# Patient Record
Sex: FEMALE | Race: BLACK OR AFRICAN AMERICAN | Employment: OTHER | URBAN - METROPOLITAN AREA
[De-identification: names, ages, dates, MRNs, and addresses within clinical notes are randomized per-mention and may not be internally consistent; named-entity substitution may affect disease eponyms.]

---

## 2021-05-11 ENCOUNTER — TELEPHONE (OUTPATIENT)
Dept: FAMILY MEDICINE CLINIC | Facility: CLINIC | Age: 51
End: 2021-05-11

## 2021-05-11 NOTE — TELEPHONE ENCOUNTER
Surgery on 05/20/21, Left L5-S1 micro, possible L4-5 lami with Dr. Kenyetta Soria @ [facility]    H&P- complete  Labs- HBG (10.1), hematocrit (33.8), MCHC (29.9), RDW-SD (47.9), ALT (9), AST (10), albumin (3.3), globulin (4.8), A/G ratio (0.7), PT (15.1), INR (1.21), PTT

## 2021-05-12 ENCOUNTER — LAB ENCOUNTER (OUTPATIENT)
Dept: LAB | Facility: HOSPITAL | Age: 51
End: 2021-05-12
Attending: FAMILY MEDICINE
Payer: MEDICARE

## 2021-05-12 ENCOUNTER — HOSPITAL ENCOUNTER (OUTPATIENT)
Dept: GENERAL RADIOLOGY | Facility: HOSPITAL | Age: 51
Discharge: HOME | End: 2021-05-12
Attending: FAMILY MEDICINE
Payer: MEDICARE

## 2021-05-12 ENCOUNTER — OFFICE VISIT (OUTPATIENT)
Dept: FAMILY MEDICINE CLINIC | Facility: CLINIC | Age: 51
End: 2021-05-12
Payer: MEDICARE

## 2021-05-12 VITALS
TEMPERATURE: 98 F | SYSTOLIC BLOOD PRESSURE: 118 MMHG | OXYGEN SATURATION: 98 % | BODY MASS INDEX: 50.14 KG/M2 | WEIGHT: 283 LBS | DIASTOLIC BLOOD PRESSURE: 82 MMHG | RESPIRATION RATE: 16 BRPM | HEIGHT: 63 IN | HEART RATE: 89 BPM

## 2021-05-12 DIAGNOSIS — Z86.73 HISTORY OF CVA (CEREBROVASCULAR ACCIDENT): ICD-10-CM

## 2021-05-12 DIAGNOSIS — Z87.891 HISTORY OF TOBACCO ABUSE: ICD-10-CM

## 2021-05-12 DIAGNOSIS — M48.061 SPINAL STENOSIS OF LUMBAR REGION, UNSPECIFIED WHETHER NEUROGENIC CLAUDICATION PRESENT: ICD-10-CM

## 2021-05-12 DIAGNOSIS — Z01.812 PRE-OPERATIVE LABORATORY EXAMINATION: ICD-10-CM

## 2021-05-12 DIAGNOSIS — Z01.818 PREOP EXAMINATION: ICD-10-CM

## 2021-05-12 DIAGNOSIS — Q21.1 PFO (PATENT FORAMEN OVALE): ICD-10-CM

## 2021-05-12 DIAGNOSIS — Z01.812 PRE-OPERATIVE LABORATORY EXAMINATION: Primary | ICD-10-CM

## 2021-05-12 DIAGNOSIS — E66.01 MORBID OBESITY (HCC): ICD-10-CM

## 2021-05-12 PROCEDURE — 86850 RBC ANTIBODY SCREEN: CPT

## 2021-05-12 PROCEDURE — 85730 THROMBOPLASTIN TIME PARTIAL: CPT

## 2021-05-12 PROCEDURE — 71046 X-RAY EXAM CHEST 2 VIEWS: CPT | Performed by: FAMILY MEDICINE

## 2021-05-12 PROCEDURE — 86901 BLOOD TYPING SEROLOGIC RH(D): CPT

## 2021-05-12 PROCEDURE — 93010 ELECTROCARDIOGRAM REPORT: CPT | Performed by: FAMILY MEDICINE

## 2021-05-12 PROCEDURE — 81015 MICROSCOPIC EXAM OF URINE: CPT

## 2021-05-12 PROCEDURE — 99204 OFFICE O/P NEW MOD 45 MIN: CPT | Performed by: FAMILY MEDICINE

## 2021-05-12 PROCEDURE — 85610 PROTHROMBIN TIME: CPT

## 2021-05-12 PROCEDURE — 36415 COLL VENOUS BLD VENIPUNCTURE: CPT

## 2021-05-12 PROCEDURE — 80053 COMPREHEN METABOLIC PANEL: CPT

## 2021-05-12 PROCEDURE — 93005 ELECTROCARDIOGRAM TRACING: CPT

## 2021-05-12 PROCEDURE — 87081 CULTURE SCREEN ONLY: CPT

## 2021-05-12 PROCEDURE — 85025 COMPLETE CBC W/AUTO DIFF WBC: CPT

## 2021-05-12 PROCEDURE — 86900 BLOOD TYPING SEROLOGIC ABO: CPT

## 2021-05-12 RX ORDER — TOPIRAMATE 100 MG/1
100 TABLET, FILM COATED ORAL 2 TIMES DAILY
COMMUNITY
Start: 2021-04-18

## 2021-05-12 RX ORDER — MELOXICAM 15 MG/1
15 TABLET ORAL DAILY
Status: ON HOLD | COMMUNITY
Start: 2021-04-18 | End: 2021-05-20

## 2021-05-12 RX ORDER — TRAMADOL HYDROCHLORIDE 50 MG/1
50 TABLET ORAL 3 TIMES DAILY PRN
Status: ON HOLD | COMMUNITY
Start: 2021-04-22 | End: 2021-05-20

## 2021-05-12 RX ORDER — GABAPENTIN 600 MG/1
600 TABLET ORAL 3 TIMES DAILY PRN
COMMUNITY
Start: 2021-04-18

## 2021-05-12 RX ORDER — ASCORBIC ACID 100 MG
1 TABLET,CHEWABLE ORAL DAILY
COMMUNITY
End: 2021-05-18

## 2021-05-12 NOTE — TELEPHONE ENCOUNTER
Dr. No Bender, please review:    Labs- HBG (10.1), hematocrit (33.8), MCHC (29.9), RDW-SD (47.9), ALT (9), AST (10), albumin (3.3), globulin (4.8), A/G ratio (0.7), PT (15.1), INR (1.21), PTT (33.0), +MSSA, all other labs WNL  EKG- abnormal  X-ray- Postproce

## 2021-05-14 PROBLEM — Z86.73 HISTORY OF CVA (CEREBROVASCULAR ACCIDENT): Status: ACTIVE | Noted: 2021-05-14

## 2021-05-14 PROBLEM — Q21.1 PFO (PATENT FORAMEN OVALE): Status: ACTIVE | Noted: 2021-05-14

## 2021-05-14 PROBLEM — M48.061 LUMBAR SPINAL STENOSIS: Status: ACTIVE | Noted: 2021-05-14

## 2021-05-14 PROBLEM — E66.01 MORBID OBESITY (HCC): Status: ACTIVE | Noted: 2021-05-14

## 2021-05-14 PROBLEM — Z87.891 HISTORY OF TOBACCO ABUSE: Status: ACTIVE | Noted: 2021-05-14

## 2021-05-14 PROBLEM — G43.909 MIGRAINE: Status: ACTIVE | Noted: 2021-05-14

## 2021-05-14 NOTE — H&P
[facility] PRE-OP CLINIC [place]    PRE-OP NOTE    HPI:   I have been consulted by Dr. Umm Upton to see Mickey Likes 52year old female for a preoperative evaluation and medical clearance. Kirk Mcknight has a long history of worsening severe low back pain.  She is History    Socioeconomic History      Marital status: Single      Spouse name: Not on file      Number of children: Not on file      Years of education: Not on file      Highest education level: Not on file    Occupational History      Not on file    [place] cough  GASTROINTESTINAL:  Denies abdominal pain, nausea, vomiting, constipation, diarrhea, or blood in stool.   MUSCULOSKELETAL: low back pain and weakness as noted above  NEUROLOGICAL:  Denies headache, seizures, dizziness, syncope, paralysis, ataxia,  HEM .0 05/12/2021    CREATSERUM 0.75 05/12/2021    BUN 11 05/12/2021     05/12/2021    K 4.0 05/12/2021     05/12/2021    CO2 26.0 05/12/2021    GLU 79 05/12/2021    CA 8.9 05/12/2021    ALB 3.3 (L) 05/12/2021    ALKPHO 53 05/12/2021    B

## 2021-05-16 NOTE — TELEPHONE ENCOUNTER
Spoke to patient let her know she is clear for surgery. She started abx ointment for +MSSA 05/15/21.

## 2021-05-18 ENCOUNTER — LAB ENCOUNTER (OUTPATIENT)
Dept: LAB | Age: 51
End: 2021-05-18
Attending: ORTHOPAEDIC SURGERY
Payer: MEDICARE

## 2021-05-18 DIAGNOSIS — Z01.818 PRE-OP TESTING: ICD-10-CM

## 2021-05-20 ENCOUNTER — ANESTHESIA EVENT (OUTPATIENT)
Dept: SURGERY | Facility: HOSPITAL | Age: 51
End: 2021-05-20
Payer: MEDICARE

## 2021-05-20 ENCOUNTER — ANESTHESIA (OUTPATIENT)
Dept: SURGERY | Facility: HOSPITAL | Age: 51
End: 2021-05-20
Payer: MEDICARE

## 2021-05-20 ENCOUNTER — HOSPITAL ENCOUNTER (OUTPATIENT)
Facility: HOSPITAL | Age: 51
Discharge: HOME | End: 2021-05-21
Attending: ORTHOPAEDIC SURGERY | Admitting: ORTHOPAEDIC SURGERY
Payer: MEDICARE

## 2021-05-20 ENCOUNTER — APPOINTMENT (OUTPATIENT)
Dept: GENERAL RADIOLOGY | Facility: HOSPITAL | Age: 51
End: 2021-05-20
Attending: ORTHOPAEDIC SURGERY
Payer: MEDICARE

## 2021-05-20 DIAGNOSIS — Z01.818 PRE-OP TESTING: Primary | ICD-10-CM

## 2021-05-20 PROBLEM — Z98.890 S/P LUMBAR LAMINECTOMY: Status: ACTIVE | Noted: 2021-05-20

## 2021-05-20 PROBLEM — Z98.890 S/P LUMBAR MICRODISCECTOMY: Status: ACTIVE | Noted: 2021-05-20

## 2021-05-20 PROCEDURE — 01NB0ZZ RELEASE LUMBAR NERVE, OPEN APPROACH: ICD-10-PCS | Performed by: ORTHOPAEDIC SURGERY

## 2021-05-20 PROCEDURE — 0ST20ZZ RESECTION OF LUMBAR VERTEBRAL DISC, OPEN APPROACH: ICD-10-PCS | Performed by: ORTHOPAEDIC SURGERY

## 2021-05-20 PROCEDURE — 76000 FLUOROSCOPY <1 HR PHYS/QHP: CPT | Performed by: ORTHOPAEDIC SURGERY

## 2021-05-20 RX ORDER — ACETAMINOPHEN 500 MG
1000 TABLET ORAL ONCE
Status: COMPLETED | OUTPATIENT
Start: 2021-05-20 | End: 2021-05-20

## 2021-05-20 RX ORDER — HYDROMORPHONE HYDROCHLORIDE 1 MG/ML
0.4 INJECTION, SOLUTION INTRAMUSCULAR; INTRAVENOUS; SUBCUTANEOUS EVERY 2 HOUR PRN
Status: DISCONTINUED | OUTPATIENT
Start: 2021-05-20 | End: 2021-05-21

## 2021-05-20 RX ORDER — GABAPENTIN 600 MG/1
600 TABLET ORAL 3 TIMES DAILY PRN
Status: DISCONTINUED | OUTPATIENT
Start: 2021-05-20 | End: 2021-05-21

## 2021-05-20 RX ORDER — HYDROMORPHONE HYDROCHLORIDE 1 MG/ML
0.6 INJECTION, SOLUTION INTRAMUSCULAR; INTRAVENOUS; SUBCUTANEOUS EVERY 5 MIN PRN
Status: DISCONTINUED | OUTPATIENT
Start: 2021-05-20 | End: 2021-05-20 | Stop reason: HOSPADM

## 2021-05-20 RX ORDER — BISACODYL 10 MG
10 SUPPOSITORY, RECTAL RECTAL
Status: DISCONTINUED | OUTPATIENT
Start: 2021-05-20 | End: 2021-05-21

## 2021-05-20 RX ORDER — HYDROMORPHONE HYDROCHLORIDE 1 MG/ML
0.8 INJECTION, SOLUTION INTRAMUSCULAR; INTRAVENOUS; SUBCUTANEOUS EVERY 2 HOUR PRN
Status: DISCONTINUED | OUTPATIENT
Start: 2021-05-20 | End: 2021-05-21

## 2021-05-20 RX ORDER — NEOSTIGMINE METHYLSULFATE 1 MG/ML
INJECTION INTRAVENOUS AS NEEDED
Status: DISCONTINUED | OUTPATIENT
Start: 2021-05-20 | End: 2021-05-20 | Stop reason: SURG

## 2021-05-20 RX ORDER — LIDOCAINE HYDROCHLORIDE 10 MG/ML
INJECTION, SOLUTION EPIDURAL; INFILTRATION; INTRACAUDAL; PERINEURAL AS NEEDED
Status: DISCONTINUED | OUTPATIENT
Start: 2021-05-20 | End: 2021-05-20 | Stop reason: SURG

## 2021-05-20 RX ORDER — DIPHENHYDRAMINE HCL 25 MG
25 CAPSULE ORAL EVERY 4 HOURS PRN
Status: DISCONTINUED | OUTPATIENT
Start: 2021-05-20 | End: 2021-05-21

## 2021-05-20 RX ORDER — SODIUM CHLORIDE, SODIUM LACTATE, POTASSIUM CHLORIDE, CALCIUM CHLORIDE 600; 310; 30; 20 MG/100ML; MG/100ML; MG/100ML; MG/100ML
INJECTION, SOLUTION INTRAVENOUS CONTINUOUS
Status: DISCONTINUED | OUTPATIENT
Start: 2021-05-20 | End: 2021-05-21

## 2021-05-20 RX ORDER — SODIUM PHOSPHATE, DIBASIC AND SODIUM PHOSPHATE, MONOBASIC 7; 19 G/133ML; G/133ML
1 ENEMA RECTAL ONCE AS NEEDED
Status: DISCONTINUED | OUTPATIENT
Start: 2021-05-20 | End: 2021-05-21

## 2021-05-20 RX ORDER — MORPHINE SULFATE 10 MG/ML
6 INJECTION, SOLUTION INTRAMUSCULAR; INTRAVENOUS EVERY 10 MIN PRN
Status: DISCONTINUED | OUTPATIENT
Start: 2021-05-20 | End: 2021-05-20 | Stop reason: HOSPADM

## 2021-05-20 RX ORDER — MORPHINE SULFATE 4 MG/ML
2 INJECTION, SOLUTION INTRAMUSCULAR; INTRAVENOUS EVERY 10 MIN PRN
Status: DISCONTINUED | OUTPATIENT
Start: 2021-05-20 | End: 2021-05-20 | Stop reason: HOSPADM

## 2021-05-20 RX ORDER — NALOXONE HYDROCHLORIDE 0.4 MG/ML
80 INJECTION, SOLUTION INTRAMUSCULAR; INTRAVENOUS; SUBCUTANEOUS AS NEEDED
Status: DISCONTINUED | OUTPATIENT
Start: 2021-05-20 | End: 2021-05-20 | Stop reason: HOSPADM

## 2021-05-20 RX ORDER — GLYCOPYRROLATE 0.2 MG/ML
INJECTION, SOLUTION INTRAMUSCULAR; INTRAVENOUS AS NEEDED
Status: DISCONTINUED | OUTPATIENT
Start: 2021-05-20 | End: 2021-05-20 | Stop reason: SURG

## 2021-05-20 RX ORDER — FAMOTIDINE 20 MG/1
20 TABLET ORAL ONCE
Status: COMPLETED | OUTPATIENT
Start: 2021-05-20 | End: 2021-05-20

## 2021-05-20 RX ORDER — HYDROMORPHONE HYDROCHLORIDE 1 MG/ML
0.2 INJECTION, SOLUTION INTRAMUSCULAR; INTRAVENOUS; SUBCUTANEOUS EVERY 2 HOUR PRN
Status: DISCONTINUED | OUTPATIENT
Start: 2021-05-20 | End: 2021-05-21

## 2021-05-20 RX ORDER — POLYETHYLENE GLYCOL 3350 17 G/17G
17 POWDER, FOR SOLUTION ORAL DAILY PRN
Status: DISCONTINUED | OUTPATIENT
Start: 2021-05-20 | End: 2021-05-21

## 2021-05-20 RX ORDER — ONDANSETRON 2 MG/ML
4 INJECTION INTRAMUSCULAR; INTRAVENOUS EVERY 4 HOURS PRN
Status: DISCONTINUED | OUTPATIENT
Start: 2021-05-20 | End: 2021-05-21

## 2021-05-20 RX ORDER — ROCURONIUM BROMIDE 10 MG/ML
INJECTION, SOLUTION INTRAVENOUS AS NEEDED
Status: DISCONTINUED | OUTPATIENT
Start: 2021-05-20 | End: 2021-05-20 | Stop reason: SURG

## 2021-05-20 RX ORDER — ACETAMINOPHEN 325 MG/1
650 TABLET ORAL EVERY 4 HOURS PRN
Status: DISCONTINUED | OUTPATIENT
Start: 2021-05-20 | End: 2021-05-21

## 2021-05-20 RX ORDER — HALOPERIDOL 5 MG/ML
0.25 INJECTION INTRAMUSCULAR ONCE AS NEEDED
Status: DISCONTINUED | OUTPATIENT
Start: 2021-05-20 | End: 2021-05-20 | Stop reason: HOSPADM

## 2021-05-20 RX ORDER — DEXAMETHASONE SODIUM PHOSPHATE 4 MG/ML
VIAL (ML) INJECTION AS NEEDED
Status: DISCONTINUED | OUTPATIENT
Start: 2021-05-20 | End: 2021-05-20 | Stop reason: SURG

## 2021-05-20 RX ORDER — HYDROCODONE BITARTRATE AND ACETAMINOPHEN 10; 325 MG/1; MG/1
2 TABLET ORAL EVERY 4 HOURS PRN
Status: DISCONTINUED | OUTPATIENT
Start: 2021-05-20 | End: 2021-05-21

## 2021-05-20 RX ORDER — TIZANIDINE 2 MG/1
2 TABLET ORAL EVERY 8 HOURS PRN
Status: DISCONTINUED | OUTPATIENT
Start: 2021-05-20 | End: 2021-05-21

## 2021-05-20 RX ORDER — DIAZEPAM 5 MG/1
5 TABLET ORAL EVERY 6 HOURS PRN
Status: DISCONTINUED | OUTPATIENT
Start: 2021-05-20 | End: 2021-05-21

## 2021-05-20 RX ORDER — DIPHENHYDRAMINE HYDROCHLORIDE 50 MG/ML
25 INJECTION INTRAMUSCULAR; INTRAVENOUS EVERY 4 HOURS PRN
Status: DISCONTINUED | OUTPATIENT
Start: 2021-05-20 | End: 2021-05-21

## 2021-05-20 RX ORDER — TOPIRAMATE 100 MG/1
100 TABLET, FILM COATED ORAL 2 TIMES DAILY
Status: DISCONTINUED | OUTPATIENT
Start: 2021-05-20 | End: 2021-05-21

## 2021-05-20 RX ORDER — PROCHLORPERAZINE EDISYLATE 5 MG/ML
10 INJECTION INTRAMUSCULAR; INTRAVENOUS EVERY 6 HOURS PRN
Status: DISCONTINUED | OUTPATIENT
Start: 2021-05-20 | End: 2021-05-21

## 2021-05-20 RX ORDER — MORPHINE SULFATE 4 MG/ML
4 INJECTION, SOLUTION INTRAMUSCULAR; INTRAVENOUS EVERY 10 MIN PRN
Status: DISCONTINUED | OUTPATIENT
Start: 2021-05-20 | End: 2021-05-20 | Stop reason: HOSPADM

## 2021-05-20 RX ORDER — BUPIVACAINE HYDROCHLORIDE AND EPINEPHRINE 5; 5 MG/ML; UG/ML
INJECTION, SOLUTION PERINEURAL AS NEEDED
Status: DISCONTINUED | OUTPATIENT
Start: 2021-05-20 | End: 2021-05-20 | Stop reason: HOSPADM

## 2021-05-20 RX ORDER — HYDROMORPHONE HYDROCHLORIDE 1 MG/ML
0.2 INJECTION, SOLUTION INTRAMUSCULAR; INTRAVENOUS; SUBCUTANEOUS EVERY 5 MIN PRN
Status: DISCONTINUED | OUTPATIENT
Start: 2021-05-20 | End: 2021-05-20 | Stop reason: HOSPADM

## 2021-05-20 RX ORDER — METOCLOPRAMIDE 10 MG/1
10 TABLET ORAL ONCE
Status: COMPLETED | OUTPATIENT
Start: 2021-05-20 | End: 2021-05-20

## 2021-05-20 RX ORDER — SENNOSIDES 8.6 MG
17.2 TABLET ORAL NIGHTLY
Status: DISCONTINUED | OUTPATIENT
Start: 2021-05-20 | End: 2021-05-21

## 2021-05-20 RX ORDER — HYDROMORPHONE HYDROCHLORIDE 1 MG/ML
0.4 INJECTION, SOLUTION INTRAMUSCULAR; INTRAVENOUS; SUBCUTANEOUS EVERY 5 MIN PRN
Status: DISCONTINUED | OUTPATIENT
Start: 2021-05-20 | End: 2021-05-20 | Stop reason: HOSPADM

## 2021-05-20 RX ORDER — HYDROCODONE BITARTRATE AND ACETAMINOPHEN 10; 325 MG/1; MG/1
1 TABLET ORAL EVERY 4 HOURS PRN
Status: DISCONTINUED | OUTPATIENT
Start: 2021-05-20 | End: 2021-05-21

## 2021-05-20 RX ORDER — PROCHLORPERAZINE EDISYLATE 5 MG/ML
5 INJECTION INTRAMUSCULAR; INTRAVENOUS ONCE AS NEEDED
Status: DISCONTINUED | OUTPATIENT
Start: 2021-05-20 | End: 2021-05-20 | Stop reason: HOSPADM

## 2021-05-20 RX ORDER — DOCUSATE SODIUM 100 MG/1
100 CAPSULE, LIQUID FILLED ORAL 2 TIMES DAILY
Status: DISCONTINUED | OUTPATIENT
Start: 2021-05-20 | End: 2021-05-21

## 2021-05-20 RX ORDER — ONDANSETRON 2 MG/ML
INJECTION INTRAMUSCULAR; INTRAVENOUS AS NEEDED
Status: DISCONTINUED | OUTPATIENT
Start: 2021-05-20 | End: 2021-05-20 | Stop reason: SURG

## 2021-05-20 RX ORDER — ONDANSETRON 2 MG/ML
4 INJECTION INTRAMUSCULAR; INTRAVENOUS ONCE AS NEEDED
Status: DISCONTINUED | OUTPATIENT
Start: 2021-05-20 | End: 2021-05-20 | Stop reason: HOSPADM

## 2021-05-20 RX ADMIN — ROCURONIUM BROMIDE 40 MG: 10 INJECTION, SOLUTION INTRAVENOUS at 11:31:00

## 2021-05-20 RX ADMIN — GLYCOPYRROLATE 0.6 MG: 0.2 INJECTION, SOLUTION INTRAMUSCULAR; INTRAVENOUS at 12:48:00

## 2021-05-20 RX ADMIN — SODIUM CHLORIDE, SODIUM LACTATE, POTASSIUM CHLORIDE, CALCIUM CHLORIDE: 600; 310; 30; 20 INJECTION, SOLUTION INTRAVENOUS at 12:52:00

## 2021-05-20 RX ADMIN — NEOSTIGMINE METHYLSULFATE 5 MG: 1 INJECTION INTRAVENOUS at 12:48:00

## 2021-05-20 RX ADMIN — DEXAMETHASONE SODIUM PHOSPHATE 4 MG: 4 MG/ML VIAL (ML) INJECTION at 11:48:00

## 2021-05-20 RX ADMIN — ONDANSETRON 4 MG: 2 INJECTION INTRAMUSCULAR; INTRAVENOUS at 11:48:00

## 2021-05-20 RX ADMIN — DEXAMETHASONE SODIUM PHOSPHATE 6 MG: 4 MG/ML VIAL (ML) INJECTION at 12:49:00

## 2021-05-20 RX ADMIN — LIDOCAINE HYDROCHLORIDE 50 MG: 10 INJECTION, SOLUTION EPIDURAL; INFILTRATION; INTRACAUDAL; PERINEURAL at 11:25:00

## 2021-05-20 RX ADMIN — SODIUM CHLORIDE, SODIUM LACTATE, POTASSIUM CHLORIDE, CALCIUM CHLORIDE: 600; 310; 30; 20 INJECTION, SOLUTION INTRAVENOUS at 11:21:00

## 2021-05-20 NOTE — PROGRESS NOTES
[facility] HOSP - [facility]    Progress Note    Naveen [place] Patient Status:  Outpatient in a Bed    1970 MRN [id number]   Location [address] UNIT Attending Wicho Zamora MD   Hosp Day # 0 PCP No primary care provi by (CST): Yaneth Cannon MD on 5/20/2021 at 2:22 PM                Assessment and Plan:     Lumbar spinal stenosis  Now surgically corrected      Morbid obesity ([address]        S/P lumbar laminectomy  Stable post op       S/P lumbar microdiscectomy  Stable post

## 2021-05-20 NOTE — PROGRESS NOTES
Patient denies anaphylaxis with Arlin Reddy, she experiences \"itching\" alleviated with Benadryl and has tolerated this medication in the past.

## 2021-05-20 NOTE — PLAN OF CARE
Alert and oriented x x3, RA, voiding freely, 1 x walker, surgical dressing c/d/I, LR @ 20, 1 norco for pain.    Problem: Patient Centered Care  Goal: Patient preferences are identified and integrated in the patient's plan of care  Description: Interventions

## 2021-05-20 NOTE — ANESTHESIA POSTPROCEDURE EVALUATION
Patient: Ben Xaio    Procedure Summary     Date: 05/20/21 Room / Location: [address] [place] [address] MAIN OR 05 / [address] [place] [address] MAIN OR    Anesthesia Start: 1494 Anesthesia Stop: 0138    Procedures:       Left L5-S1 microdiscectomy, L4-5 laminectomy (Left Spine Lumbar)      LUMBA

## 2021-05-20 NOTE — H&P
[address] Patient Status:  Outpatient in a Bed    1970 MRN [id number]   Location [facility] Attending Ramón Mccann MD   Hosp Day # 0 PCP No primary care provider on file.      Active Pro strength 4/5 EHL/tib ant on the left with 5-/5 on the right, calves soft and non tender, 2+DP    Assessment: Left L5-S1 HNP, left lumbar radiculopathy    Plan: L5-S1 microdiscectomy, possible L4-5 laminectomy    Real Hubbard PA-C with Tesfaye Simon MD

## 2021-05-20 NOTE — ANESTHESIA PREPROCEDURE EVALUATION
Anesthesia PreOp Note    HPI:     Shahbaz Greco is a 52year old female who presents for preoperative consultation requested by: Rocio Angulo MD    Date of Surgery: 5/20/2021    Procedure(s):  Left L5-S1 microdiscectomy, possible L4-5 laminectomy  JAKUB 3 (three) times daily as needed. , Disp: , Rfl: , 5/19/2021 at 0600  traMADol HCl 50 MG Oral Tab, Take 50 mg by mouth 3 (three) times daily as needed. , Disp: , Rfl: , 5/19/2021 at 0600  Meloxicam 15 MG Oral Tab, Take 15 mg by mouth daily. , Disp: , Rfl: ,     Difficulty of Paying Living Expenses:   Food Insecurity:       Worried About [address] in the Last Year:       Ran Out of Food in the Last Year:   Transportation Needs:       Lack of Transportation (Medical):       Lack of Transportation (No reviewed and Nursing notes reviewed    No history of anesthetic complications (woke up during gastric sleeve surgery)   Airway   Mallampati: III  TM distance: >3 FB  Neck ROM: full  Dental - normal exam         Pulmonary - negative ROS   Cardiovascular - n

## 2021-05-20 NOTE — ANESTHESIA PROCEDURE NOTES
Airway  Urgency: elective      General Information and Staff    Patient location during procedure: OR  Anesthesiologist: Cruz Pelaez MD  Performed: anesthesiologist     Indications and Patient Condition  Indications for airway management: anesthe

## 2021-05-21 VITALS
BODY MASS INDEX: 51.91 KG/M2 | OXYGEN SATURATION: 100 % | SYSTOLIC BLOOD PRESSURE: 115 MMHG | HEART RATE: 73 BPM | DIASTOLIC BLOOD PRESSURE: 63 MMHG | WEIGHT: 293 LBS | HEIGHT: 63 IN | RESPIRATION RATE: 17 BRPM | TEMPERATURE: 98 F

## 2021-05-21 PROCEDURE — 80048 BASIC METABOLIC PNL TOTAL CA: CPT | Performed by: FAMILY MEDICINE

## 2021-05-21 PROCEDURE — 85027 COMPLETE CBC AUTOMATED: CPT | Performed by: FAMILY MEDICINE

## 2021-05-21 NOTE — OPERATIVE REPORT
PATIENT    Tiffany Golden    DATE OF SURGERY  5/20/21    SURGEON   Sara Nunn MD    ASSISTANT  ANA MARIA Summers    PREOPERATIVE DIAGNOSIS   Left lumbar radiculopathy with motor weakness  Large L5-S1 superiorly migrated HNP  L4-5 lateral recess stenosis dissection. Sequential   dilators were advanced into the caudal edge of the L5 lamina   under fluoroscopy. A tubular retractor was then advanced and secured   to the operating table. Positioning of this was confirmed with   biplanar fluoroscopy.      The mi

## 2021-05-21 NOTE — PLAN OF CARE
Patient is POD1 today , alert and oriented X4, on room air and resting in bed. Patient voids freely. PRN Norco , Zanaflex for pain management. Patient is up with standby assist and rolling walker. Preexisting tingling in left toes Per Patient.  Post Ancef f development  - Assess and document skin integrity  - Assess and document dressing/incision, wound bed, drain sites and surrounding tissue  - Implement wound care per orders  - Initiate isolation precautions as appropriate  - Initiate Pressure Ulcer prevent Problem: RISK FOR INFECTION - ADULT  Goal: Absence of fever/infection during anticipated neutropenic period  Description: INTERVENTIONS  - Monitor WBC  - Administer growth factors as ordered  - Implement neutropenic guidelines  Outcome: Progressing     P

## 2024-05-02 ENCOUNTER — OFFICE VISIT (OUTPATIENT)
Dept: OTHER | Age: 54
End: 2024-05-02

## 2024-05-02 DIAGNOSIS — Z65.4 VICTIM OF CRIME: Primary | ICD-10-CM

## 2024-05-02 ASSESSMENT — LIFESTYLE VARIABLES
IN YOUR LIFETIME HAVE YOU EVER USED AMPHETAMINE TYPE STIMULANTS: NO
IN THE PAST 12 MONTHS HOW OFTEN HAVE YOU USED CANNABIS: NEVER
TOBACCO_USE_LAST_TWELVE_MONTHS: DAILY/ALMOST DAILY
IN YOUR LIFETIME HAVE YOU EVER USED CANNABIS: NO
IN THE PAST 12 MONTHS HOW OFTEN HAVE YOU USED OPIOIDS: NEVER
IN YOUR LIFETIME HAVE YOU EVER USED COCAINE TYPE STIMULANTS: NO
IN YOUR LIFETIME HAVE YOU EVER USED SEDATIVES OR SLEEPING PILLS: NO
EVER_SMOKED: YES
IN THE PAST 12 MONTHS HOW OFTEN HAVE YOU USED SEDATIVES OR SLEEPING PILLS: NEVER
IN THE PAST 12 MONTHS HOW OFTEN HAVE YOU USED COCAINE TYPE STIMULANTS: NEVER
IN THE PAST 12 MONTHS HOW OFTEN HAVE YOU USED AMPHETAMINE TYPE STIMULANTS: NEVER

## 2024-05-08 ENCOUNTER — APPOINTMENT (OUTPATIENT)
Dept: OTHER | Age: 54
End: 2024-05-08

## 2024-05-08 ENCOUNTER — TELEPHONE (OUTPATIENT)
Dept: OTHER | Age: 54
End: 2024-05-08

## 2024-05-09 ENCOUNTER — APPOINTMENT (OUTPATIENT)
Dept: OTHER | Age: 54
End: 2024-05-09

## 2024-05-10 ENCOUNTER — V-VISIT (OUTPATIENT)
Dept: OTHER | Age: 54
End: 2024-05-10

## 2024-05-10 ENCOUNTER — E-ADVICE (OUTPATIENT)
Dept: OTHER | Age: 54
End: 2024-05-10

## 2024-05-10 DIAGNOSIS — Z65.4 VICTIM OF CRIME: Primary | ICD-10-CM

## 2024-05-14 ENCOUNTER — APPOINTMENT (OUTPATIENT)
Dept: OTHER | Age: 54
End: 2024-05-14

## 2024-05-14 ENCOUNTER — TELEPHONE (OUTPATIENT)
Dept: OTHER | Age: 54
End: 2024-05-14

## 2024-05-14 ENCOUNTER — E-ADVICE (OUTPATIENT)
Dept: OTHER | Age: 54
End: 2024-05-14

## 2024-05-23 ENCOUNTER — E-ADVICE (OUTPATIENT)
Dept: OTHER | Age: 54
End: 2024-05-23

## 2024-05-23 ENCOUNTER — OFFICE VISIT (OUTPATIENT)
Dept: OTHER | Age: 54
End: 2024-05-23

## 2024-05-23 DIAGNOSIS — Z65.4 VICTIM OF CRIME: Primary | ICD-10-CM

## 2024-05-23 ASSESSMENT — ANXIETY QUESTIONNAIRES
2. NOT BEING ABLE TO STOP OR CONTROL WORRYING: 1
GAD7 TOTAL SCORE: 14
3. WORRYING TOO MUCH ABOUT DIFFERENT THINGS: 2
7. FEELING AFRAID AS IF SOMETHING AWFUL MIGHT HAPPEN: SEVERAL DAYS
5. BEING SO RESTLESS THAT IT IS HARD TO SIT STILL: 3
1. FEELING NERVOUS, ANXIOUS, OR ON EDGE: 1
7. FEELING AFRAID AS IF SOMETHING AWFUL MIGHT HAPPEN: 1
IF YOU CHECKED OFF ANY PROBLEMS ON THIS QUESTIONNAIRE, HOW DIFFICULT HAVE THESE PROBLEMS MADE IT FOR YOU TO DO YOUR WORK, TAKE CARE OF THINGS AT HOME, OR GET ALONG WITH OTHER PEOPLE: SOMEWHAT DIFFICULT
6. BECOMING EASILY ANNOYED OR IRRITABLE: 3
2. NOT BEING ABLE TO STOP OR CONTROL WORRYING: SEVERAL DAYS
5. BEING SO RESTLESS THAT IT IS HARD TO SIT STILL: NEARLY EVERY DAY
4. TROUBLE RELAXING: NEARLY EVERY DAY
3. WORRYING TOO MUCH ABOUT DIFFERENT THINGS: MORE THAN HALF THE DAYS
1. FEELING NERVOUS, ANXIOUS, OR ON EDGE: SEVERAL DAYS
6. BECOMING EASILY ANNOYED OR IRRITABLE: NEARLY EVERY DAY
4. TROUBLE RELAXING: 3

## 2024-05-23 ASSESSMENT — PATIENT HEALTH QUESTIONNAIRE - PHQ9
4. FEELING TIRED OR HAVING LITTLE ENERGY: NEARLY EVERY DAY
5. POOR APPETITE OR OVEREATING: MORE THAN HALF THE DAYS
10. IF YOU CHECKED OFF ANY PROBLEMS, HOW DIFFICULT HAVE THESE PROBLEMS MADE IT FOR YOU TO DO YOUR WORK, TAKE CARE OF THINGS AT HOME, OR GET ALONG WITH OTHER PEOPLE: NO
2. FEELING DOWN, DEPRESSED OR HOPELESS: SEVERAL DAYS
7. TROUBLE CONCENTRATING ON THINGS, SUCH AS READING THE NEWSPAPER OR WATCHING TELEVISION: NEARLY EVERY DAY
1. LITTLE INTEREST OR PLEASURE IN DOING THINGS: NOT AT ALL
CLINICAL INTERPRETATION OF PHQ9 SCORE: MODERATELY SEVERE DEPRESSION
3. TROUBLE FALLING OR STAYING ASLEEP OR SLEEPING TOO MUCH: NEARLY EVERY DAY
6. FEELING BAD ABOUT YOURSELF - OR THAT YOU ARE A FAILURE OR HAVE LET YOURSELF OR YOUR FAMILY DOWN: SEVERAL DAYS
9. THOUGHTS THAT YOU WOULD BE BETTER OFF DEAD, OR OF HURTING YOURSELF: NOT AT ALL
CLINICAL INTERPRETATION OF PHQ2 SCORE: NO FURTHER SCREENING NEEDED
SUM OF ALL RESPONSES TO PHQ9 QUESTIONS 1 AND 2: 1
SUM OF ALL RESPONSES TO PHQ9 QUESTIONS 1 AND 2: 1
8. MOVING OR SPEAKING SO SLOWLY THAT OTHER PEOPLE COULD HAVE NOTICED. OR THE OPPOSITE, BEING SO FIGETY OR RESTLESS THAT YOU HAVE BEEN MOVING AROUND A LOT MORE THAN USUAL: MORE THAN HALF THE DAYS
SUM OF ALL RESPONSES TO PHQ QUESTIONS 1-9: 15

## 2024-05-23 ASSESSMENT — SLEEP AND FATIGUE QUESTIONNAIRES
SLEEP_WAS_REFRESHING: 5 - NOT AT ALL
QUESTION7: 5
QUESTION1: 4
PROBLEM_WITH_SLEEP: 5 - VERY MUCH
QUESTION5: 5
QUESTION2: 5
TROUBLE_STAYING_ASLEEP: 5 - ALWAYS
SATISFIED_WITH_SLEEP: 5 - NOT AT ALL
QUESTION3: 5
QUESTION4: 4
TRIED_HARD_TO_GET_TO_SLEEP: 5 - VERY MUCH
QUESTION6: 5
QUESTION9: 5
GOT_ENOUGH_SLEEP: 5 - NEVER
SLEEP_QUALITY: 4 - POOR
SLEEP_DISTUBANCE_SCORE: 43
TROUBLE_SLEEPING: 5 - ALWAYS
QUESTION8: 5

## 2024-05-23 ASSESSMENT — PATIENT HEALTH QUESTIONNAIRE - GENERAL
HAVE THE SYMPTOMS OF (NAME THE HYPOMANIC SYMPTOMS THAT WERE ENDORSED) BEEN PRESENT FOR AT LEAST 2 YEARS (AT LEAST 1 YEAR IN CHILDREN AND ADOLESCENTS): NO
DO YOU EVER HAVE TIMES THAT FOR AT LEAST 4 DAYS IN A ROW BUT LESS THAN 1 WEEK YOU HAVE ABNORMALLY AND CONTINUOUSLY HIGH, BIG, OR IRRITABLE MOOD: NO
DO YOU EVER HAVE TIMES THAT FOR AT LEAST 1 WEEK YOU HAVE ABNORMALLY AND CONTINUOUSLY HIGH, BIG, OR IRRTABLE MOOD: NO
DURING YOUR LIFETIME HAVE EVER YOU FELT DEPRESSED OR SAD MOST DAYS, EVEN IF YOU FELT OKAY SOMETIMES?: NO
WAS THERE A TIME OF AT LEAST 2 MONTHS WHERE YOU FELT OK (WITHOUT DEPRESSION OR LOSS OF INTEREST) BETWEEN NOW AND THE LAST TIME YOU FELT DEPRESSED?: YES
DO YOU EVER HAVE TIMES FOR AT LEAST 1 WEEK WHEN MOST OF THE DAY OR NEARLY EVERY DAY YOU ARE EXPERIENCING INCREASED GOAL-DIRECTED ACTIVITY OR ENERGY: NO
DO YOU EVER HAVE TIMES FOR AT LEAST 4 DAYS IN A ROW BUT LESS THAN 1 WEEK WHEN MOST OF THE DAY OR NEARLY EVERY DAY YOU ARE EXPERIENCING INCREASED GOAL-DIRECTED ACTIVITY OR ENERGY?: NO
DURING YOUR LIFETIME, HAVE YOU EVER HAD ANOTHER TIME OF 2 WEEKS OR MORE WHEN YOU FELT DEPRESSED OR UNINTERESTED IN MOST THINGS, AND HAD MOST OF THE PROBLEMS WE JUST TALKED ABOUT: YES
DURING THE 2 YEAR PERIOD (1 YEAR FOR CHILDREN AND ADOLESCENTS) HAVE THE SYMPTOMS WE JUST DISCUSSED EVER BEEN ABSENT FOR 2 MONTHS AT A TIME?: NO
DURING THE 2 YEAR PERIOD (1 YEAR FOR CHILDREN AND ADOLESCENTS) HAVE THE SYMPTOMS WE JUST DISCUSSED BEEN PRESENT AT LEAST HALF OF THE TIME?: NO
HAVE THE SYMPTOMS OF (NAME DEPRESSION SYMPTOMS THAT WERE ENDORSED) BEEN PRESENT FOR AT LEAST 2 YEARS (AT LEAST 1 YEAR IN CHILDREN AND ADOLESCENTS): NO

## 2024-05-23 ASSESSMENT — PAIN SCALES - PAIN ENJOYMENT GENERAL ACTIVITY SCALE (PEG)
AVG_PAIN_PASTWEEK: 10 (PAIN AS BAS AS YOU CAN IMAGINE)
PEG_TOTALSCORE: 10
INTERFERED_GENERAL_ACTIVITY: 10 (COMPLETELY INTERFERES)
INTERFERED_ENJOYMENT_LIFE: 10 (COMPLETELY INTERFERES)

## 2024-05-28 ENCOUNTER — E-ADVICE (OUTPATIENT)
Dept: OTHER | Age: 54
End: 2024-05-28

## 2024-05-28 ENCOUNTER — V-VISIT (OUTPATIENT)
Dept: OTHER | Age: 54
End: 2024-05-28

## 2024-05-28 DIAGNOSIS — Z65.4 VICTIM OF CRIME: Primary | ICD-10-CM

## 2024-06-04 ENCOUNTER — TELEPHONE (OUTPATIENT)
Dept: OTHER | Age: 54
End: 2024-06-04

## 2024-06-11 ENCOUNTER — TELEPHONE (OUTPATIENT)
Dept: OTHER | Age: 54
End: 2024-06-11

## 2024-06-13 ENCOUNTER — APPOINTMENT (OUTPATIENT)
Dept: OTHER | Age: 54
End: 2024-06-13

## 2024-06-14 ENCOUNTER — APPOINTMENT (OUTPATIENT)
Dept: OTHER | Age: 54
End: 2024-06-14

## 2024-06-14 DIAGNOSIS — Z65.4 VICTIM OF CRIME: Primary | ICD-10-CM

## 2024-06-18 ENCOUNTER — TELEPHONE (OUTPATIENT)
Dept: OTHER | Age: 54
End: 2024-06-18

## 2024-06-19 ENCOUNTER — APPOINTMENT (OUTPATIENT)
Dept: OTHER | Age: 54
End: 2024-06-19

## 2024-06-19 DIAGNOSIS — Z65.4 VICTIM OF CRIME: Primary | ICD-10-CM

## 2024-06-25 ENCOUNTER — V-VISIT (OUTPATIENT)
Dept: OTHER | Age: 54
End: 2024-06-25

## 2024-06-25 DIAGNOSIS — Z65.4 VICTIM OF CRIME: Primary | ICD-10-CM

## 2024-07-01 ENCOUNTER — V-VISIT (OUTPATIENT)
Dept: OTHER | Age: 54
End: 2024-07-01

## 2024-07-01 DIAGNOSIS — Z65.4 VICTIM OF CRIME: Primary | ICD-10-CM

## 2024-07-03 ENCOUNTER — V-VISIT (OUTPATIENT)
Dept: OTHER | Age: 54
End: 2024-07-03

## 2024-07-03 DIAGNOSIS — Z65.4 VICTIM OF CRIME: Primary | ICD-10-CM

## 2024-07-08 ENCOUNTER — V-VISIT (OUTPATIENT)
Dept: OTHER | Age: 54
End: 2024-07-08

## 2024-07-08 DIAGNOSIS — Z65.4 VICTIM OF CRIME: Primary | ICD-10-CM

## 2024-07-08 DIAGNOSIS — F43.10 PTSD (POST-TRAUMATIC STRESS DISORDER): ICD-10-CM

## 2024-07-09 ENCOUNTER — V-VISIT (OUTPATIENT)
Dept: OTHER | Age: 54
End: 2024-07-09

## 2024-07-09 DIAGNOSIS — Z65.4 VICTIM OF CRIME: Primary | ICD-10-CM

## 2024-07-15 ENCOUNTER — V-VISIT (OUTPATIENT)
Dept: OTHER | Age: 54
End: 2024-07-15

## 2024-07-15 DIAGNOSIS — Z65.4 VICTIM OF CRIME: ICD-10-CM

## 2024-07-15 DIAGNOSIS — F43.10 PTSD (POST-TRAUMATIC STRESS DISORDER): Primary | ICD-10-CM

## 2024-07-15 ASSESSMENT — ANXIETY QUESTIONNAIRES
7. FEELING AFRAID AS IF SOMETHING AWFUL MIGHT HAPPEN: 0
3. WORRYING TOO MUCH ABOUT DIFFERENT THINGS: NEARLY EVERY DAY
4. TROUBLE RELAXING: 3
GAD7 TOTAL SCORE: 16
2. NOT BEING ABLE TO STOP OR CONTROL WORRYING: 2
7. FEELING AFRAID AS IF SOMETHING AWFUL MIGHT HAPPEN: NOT AT ALL
1. FEELING NERVOUS, ANXIOUS, OR ON EDGE: 3
5. BEING SO RESTLESS THAT IT IS HARD TO SIT STILL: MORE THAN HALF THE DAYS
6. BECOMING EASILY ANNOYED OR IRRITABLE: NEARLY EVERY DAY
1. FEELING NERVOUS, ANXIOUS, OR ON EDGE: NEARLY EVERY DAY
6. BECOMING EASILY ANNOYED OR IRRITABLE: 3
5. BEING SO RESTLESS THAT IT IS HARD TO SIT STILL: 2
4. TROUBLE RELAXING: NEARLY EVERY DAY
IF YOU CHECKED OFF ANY PROBLEMS ON THIS QUESTIONNAIRE, HOW DIFFICULT HAVE THESE PROBLEMS MADE IT FOR YOU TO DO YOUR WORK, TAKE CARE OF THINGS AT HOME, OR GET ALONG WITH OTHER PEOPLE: VERY DIFFICULT
3. WORRYING TOO MUCH ABOUT DIFFERENT THINGS: 3
2. NOT BEING ABLE TO STOP OR CONTROL WORRYING: MORE THAN HALF THE DAYS

## 2024-07-15 ASSESSMENT — PATIENT HEALTH QUESTIONNAIRE - GENERAL
DO YOU EVER HAVE TIMES THAT FOR AT LEAST 1 WEEK YOU HAVE ABNORMALLY AND CONTINUOUSLY HIGH, BIG, OR IRRTABLE MOOD: NO
WITH MOST OF THE PROBLEMS WE JUST TALKED ABOUT, HOW LONG HAVE YOU BEEN FEELING THIS WAY: MONTHS
DO YOU EVER HAVE TIMES THAT FOR AT LEAST 4 DAYS IN A ROW BUT LESS THAN 1 WEEK YOU HAVE ABNORMALLY AND CONTINUOUSLY HIGH, BIG, OR IRRITABLE MOOD: NO
HAVE THE SYMPTOMS OF (NAME THE HYPOMANIC SYMPTOMS THAT WERE ENDORSED) BEEN PRESENT FOR AT LEAST 2 YEARS (AT LEAST 1 YEAR IN CHILDREN AND ADOLESCENTS): NO
HAVE THE SYMPTOMS OF (NAME DEPRESSION SYMPTOMS THAT WERE ENDORSED) BEEN PRESENT FOR AT LEAST 2 YEARS (AT LEAST 1 YEAR IN CHILDREN AND ADOLESCENTS): NO
DO YOU EVER HAVE TIMES FOR AT LEAST 4 DAYS IN A ROW BUT LESS THAN 1 WEEK WHEN MOST OF THE DAY OR NEARLY EVERY DAY YOU ARE EXPERIENCING INCREASED GOAL-DIRECTED ACTIVITY OR ENERGY?: NO
DURING YOUR LIFETIME, HAVE YOU EVER HAD ANOTHER TIME OF 2 WEEKS OR MORE WHEN YOU FELT DEPRESSED OR UNINTERESTED IN MOST THINGS, AND HAD MOST OF THE PROBLEMS WE JUST TALKED ABOUT: YES
DURING THE 2 YEAR PERIOD (1 YEAR FOR CHILDREN AND ADOLESCENTS) HAVE THE SYMPTOMS WE JUST DISCUSSED BEEN PRESENT AT LEAST HALF OF THE TIME?: NO
DURING THE 2 YEAR PERIOD (1 YEAR FOR CHILDREN AND ADOLESCENTS) HAVE THE SYMPTOMS WE JUST DISCUSSED EVER BEEN ABSENT FOR 2 MONTHS AT A TIME?: NO
WAS THERE A TIME OF AT LEAST 2 MONTHS WHERE YOU FELT OK (WITHOUT DEPRESSION OR LOSS OF INTEREST) BETWEEN NOW AND THE LAST TIME YOU FELT DEPRESSED?: YES
DO YOU EVER HAVE TIMES FOR AT LEAST 1 WEEK WHEN MOST OF THE DAY OR NEARLY EVERY DAY YOU ARE EXPERIENCING INCREASED GOAL-DIRECTED ACTIVITY OR ENERGY: NO
DURING YOUR LIFETIME HAVE EVER YOU FELT DEPRESSED OR SAD MOST DAYS, EVEN IF YOU FELT OKAY SOMETIMES?: NO

## 2024-07-15 ASSESSMENT — PATIENT HEALTH QUESTIONNAIRE - PHQ9
9. THOUGHTS THAT YOU WOULD BE BETTER OFF DEAD, OR OF HURTING YOURSELF: MORE THAN HALF THE DAYS
10. IF YOU CHECKED OFF ANY PROBLEMS, HOW DIFFICULT HAVE THESE PROBLEMS MADE IT FOR YOU TO DO YOUR WORK, TAKE CARE OF THINGS AT HOME, OR GET ALONG WITH OTHER PEOPLE: NO
CLINICAL INTERPRETATION OF PHQ9 SCORE: SEVERE DEPRESSION
2. FEELING DOWN, DEPRESSED OR HOPELESS: SEVERAL DAYS
SUM OF ALL RESPONSES TO PHQ9 QUESTIONS 1 AND 2: 4
3. TROUBLE FALLING OR STAYING ASLEEP OR SLEEPING TOO MUCH: NEARLY EVERY DAY
4. FEELING TIRED OR HAVING LITTLE ENERGY: NEARLY EVERY DAY
5. POOR APPETITE OR OVEREATING: NEARLY EVERY DAY
1. LITTLE INTEREST OR PLEASURE IN DOING THINGS: NEARLY EVERY DAY
6. FEELING BAD ABOUT YOURSELF - OR THAT YOU ARE A FAILURE OR HAVE LET YOURSELF OR YOUR FAMILY DOWN: MORE THAN HALF THE DAYS
CLINICAL INTERPRETATION OF PHQ2 SCORE: FURTHER SCREENING NEEDED
SUM OF ALL RESPONSES TO PHQ9 QUESTIONS 1 AND 2: 4
SUM OF ALL RESPONSES TO PHQ QUESTIONS 1-9: 22
8. MOVING OR SPEAKING SO SLOWLY THAT OTHER PEOPLE COULD HAVE NOTICED. OR THE OPPOSITE, BEING SO FIGETY OR RESTLESS THAT YOU HAVE BEEN MOVING AROUND A LOT MORE THAN USUAL: MORE THAN HALF THE DAYS
7. TROUBLE CONCENTRATING ON THINGS, SUCH AS READING THE NEWSPAPER OR WATCHING TELEVISION: NEARLY EVERY DAY

## 2024-07-15 ASSESSMENT — PAIN SCALES - PAIN ENJOYMENT GENERAL ACTIVITY SCALE (PEG)
INTERFERED_GENERAL_ACTIVITY: 10 (COMPLETELY INTERFERES)
INTERFERED_ENJOYMENT_LIFE: 10 (COMPLETELY INTERFERES)
PEG_TOTALSCORE: 10
AVG_PAIN_PASTWEEK: 10 (PAIN AS BAS AS YOU CAN IMAGINE)

## 2024-07-17 ENCOUNTER — APPOINTMENT (OUTPATIENT)
Dept: OTHER | Age: 54
End: 2024-07-17

## 2024-07-17 DIAGNOSIS — Z65.4 VICTIM OF CRIME: Primary | ICD-10-CM

## 2024-07-22 ENCOUNTER — V-VISIT (OUTPATIENT)
Dept: OTHER | Age: 54
End: 2024-07-22

## 2024-07-22 DIAGNOSIS — F43.10 PTSD (POST-TRAUMATIC STRESS DISORDER): Primary | ICD-10-CM

## 2024-07-22 DIAGNOSIS — Z65.4 VICTIM OF CRIME: ICD-10-CM

## 2024-07-22 ASSESSMENT — SLEEP AND FATIGUE QUESTIONNAIRES
QUESTION1: 5
TROUBLE_STAYING_ASLEEP: 5 - ALWAYS
GOT_ENOUGH_SLEEP: 5 - NEVER
QUESTION6: 5
QUESTION7: 5
TRIED_HARD_TO_GET_TO_SLEEP: 5 - VERY MUCH
QUESTION8: 5
SLEEP_QUALITY: 5 - VERY POOR
SATISFIED_WITH_SLEEP: 5 - NOT AT ALL
PROBLEM_WITH_SLEEP: 5 - VERY MUCH
QUESTION5: 5
QUESTION4: 5
DIFFICULTY_FALLING_ASLEEP: 5 - VERY MUCH
QUESTION2: 5
SLEEP_DISTUBANCE_SCORE: 45
QUESTION9: 5
TROUBLE_SLEEPING: 5 - ALWAYS
QUESTION3: 5
SLEEP_WAS_REFRESHING: 5 - NOT AT ALL

## 2024-07-23 ENCOUNTER — APPOINTMENT (OUTPATIENT)
Dept: OTHER | Age: 54
End: 2024-07-23

## 2024-07-23 DIAGNOSIS — Z65.4 VICTIM OF CRIME: Primary | ICD-10-CM

## 2024-07-29 ENCOUNTER — V-VISIT (OUTPATIENT)
Dept: OTHER | Age: 54
End: 2024-07-29

## 2024-07-29 DIAGNOSIS — Z65.4 VICTIM OF CRIME: ICD-10-CM

## 2024-07-29 DIAGNOSIS — F43.10 PTSD (POST-TRAUMATIC STRESS DISORDER): Primary | ICD-10-CM

## 2024-07-29 ASSESSMENT — SLEEP AND FATIGUE QUESTIONNAIRES
SLEEP_DISTUBANCE_SCORE: 45
QUESTION9: 5
QUESTION8: 5
QUESTION5: 5
SLEEP_WAS_REFRESHING: 5 - NOT AT ALL
TRIED_HARD_TO_GET_TO_SLEEP: 5 - VERY MUCH
QUESTION4: 5
QUESTION2: 5
GOT_ENOUGH_SLEEP: 5 - NEVER
QUESTION1: 5
SATISFIED_WITH_SLEEP: 5 - NOT AT ALL
SLEEP_QUALITY: 5 - VERY POOR
PROBLEM_WITH_SLEEP: 5 - VERY MUCH
TROUBLE_STAYING_ASLEEP: 5 - ALWAYS
QUESTION3: 5
DIFFICULTY_FALLING_ASLEEP: 5 - VERY MUCH
QUESTION7: 5
TROUBLE_SLEEPING: 5 - ALWAYS
QUESTION6: 5

## 2024-07-30 ENCOUNTER — V-VISIT (OUTPATIENT)
Dept: OTHER | Age: 54
End: 2024-07-30

## 2024-07-30 DIAGNOSIS — Z65.4 VICTIM OF CRIME: Primary | ICD-10-CM

## 2024-08-05 ENCOUNTER — APPOINTMENT (OUTPATIENT)
Dept: OTHER | Age: 54
End: 2024-08-05

## 2024-08-05 DIAGNOSIS — Z65.4 VICTIM OF CRIME: Primary | ICD-10-CM

## 2024-08-05 DIAGNOSIS — F43.10 PTSD (POST-TRAUMATIC STRESS DISORDER): ICD-10-CM

## 2024-08-06 ENCOUNTER — APPOINTMENT (OUTPATIENT)
Dept: OTHER | Age: 54
End: 2024-08-06

## 2024-08-06 DIAGNOSIS — Z65.4 VICTIM OF CRIME: Primary | ICD-10-CM

## 2024-08-14 ENCOUNTER — APPOINTMENT (OUTPATIENT)
Dept: OTHER | Age: 54
End: 2024-08-14

## 2024-08-14 ENCOUNTER — TELEPHONE (OUTPATIENT)
Dept: OTHER | Age: 54
End: 2024-08-14

## 2024-08-20 ENCOUNTER — APPOINTMENT (OUTPATIENT)
Dept: OTHER | Age: 54
End: 2024-08-20

## 2024-08-20 DIAGNOSIS — Z65.4 VICTIM OF CRIME: Primary | ICD-10-CM

## 2024-08-21 ENCOUNTER — APPOINTMENT (OUTPATIENT)
Dept: OTHER | Age: 54
End: 2024-08-21

## 2024-08-21 DIAGNOSIS — Z65.4 VICTIM OF CRIME: Primary | ICD-10-CM

## 2024-08-21 DIAGNOSIS — F43.10 PTSD (POST-TRAUMATIC STRESS DISORDER): ICD-10-CM

## 2024-08-27 ENCOUNTER — APPOINTMENT (OUTPATIENT)
Dept: OTHER | Age: 54
End: 2024-08-27

## 2024-08-27 DIAGNOSIS — F43.10 PTSD (POST-TRAUMATIC STRESS DISORDER): ICD-10-CM

## 2024-08-27 DIAGNOSIS — Z65.4 VICTIM OF CRIME: Primary | ICD-10-CM

## 2024-08-29 ENCOUNTER — TELEPHONE (OUTPATIENT)
Dept: OTHER | Age: 54
End: 2024-08-29

## 2024-08-29 ENCOUNTER — APPOINTMENT (OUTPATIENT)
Dept: OTHER | Age: 54
End: 2024-08-29

## 2024-09-04 ENCOUNTER — OFFICE VISIT (OUTPATIENT)
Dept: OTHER | Age: 54
End: 2024-09-04

## 2024-09-04 DIAGNOSIS — Z65.4 VICTIM OF CRIME: Primary | ICD-10-CM

## 2024-09-09 ENCOUNTER — APPOINTMENT (OUTPATIENT)
Dept: OTHER | Age: 54
End: 2024-09-09

## 2024-09-09 DIAGNOSIS — F43.10 PTSD (POST-TRAUMATIC STRESS DISORDER): Primary | ICD-10-CM

## 2024-09-09 DIAGNOSIS — Z65.4 VICTIM OF CRIME: ICD-10-CM

## 2024-09-10 ENCOUNTER — V-VISIT (OUTPATIENT)
Dept: OTHER | Age: 54
End: 2024-09-10

## 2024-09-10 DIAGNOSIS — Z65.4 VICTIM OF CRIME: Primary | ICD-10-CM

## 2024-09-12 ENCOUNTER — TELEPHONE (OUTPATIENT)
Dept: OTHER | Age: 54
End: 2024-09-12

## 2024-09-16 ENCOUNTER — APPOINTMENT (OUTPATIENT)
Dept: OTHER | Age: 54
End: 2024-09-16

## 2024-09-16 DIAGNOSIS — Z65.4 VICTIM OF CRIME: Primary | ICD-10-CM

## 2024-09-16 ASSESSMENT — PATIENT HEALTH QUESTIONNAIRE - PHQ9
10. IF YOU CHECKED OFF ANY PROBLEMS, HOW DIFFICULT HAVE THESE PROBLEMS MADE IT FOR YOU TO DO YOUR WORK, TAKE CARE OF THINGS AT HOME, OR GET ALONG WITH OTHER PEOPLE: YES
SUM OF ALL RESPONSES TO PHQ QUESTIONS 1-9: 17
7. TROUBLE CONCENTRATING ON THINGS, SUCH AS READING THE NEWSPAPER OR WATCHING TELEVISION: NEARLY EVERY DAY
CLINICAL INTERPRETATION OF PHQ9 SCORE: MODERATELY SEVERE DEPRESSION
SUM OF ALL RESPONSES TO PHQ9 QUESTIONS 1 AND 2: 2
1. LITTLE INTEREST OR PLEASURE IN DOING THINGS: SEVERAL DAYS
SUM OF ALL RESPONSES TO PHQ9 QUESTIONS 1 AND 2: 2
5. POOR APPETITE OR OVEREATING: MORE THAN HALF THE DAYS
4. FEELING TIRED OR HAVING LITTLE ENERGY: NEARLY EVERY DAY
8. MOVING OR SPEAKING SO SLOWLY THAT OTHER PEOPLE COULD HAVE NOTICED. OR THE OPPOSITE, BEING SO FIGETY OR RESTLESS THAT YOU HAVE BEEN MOVING AROUND A LOT MORE THAN USUAL: SEVERAL DAYS
CLINICAL INTERPRETATION OF PHQ2 SCORE: NO FURTHER SCREENING NEEDED
9. THOUGHTS THAT YOU WOULD BE BETTER OFF DEAD, OR OF HURTING YOURSELF: MORE THAN HALF THE DAYS
6. FEELING BAD ABOUT YOURSELF - OR THAT YOU ARE A FAILURE OR HAVE LET YOURSELF OR YOUR FAMILY DOWN: SEVERAL DAYS
2. FEELING DOWN, DEPRESSED OR HOPELESS: SEVERAL DAYS
3. TROUBLE FALLING OR STAYING ASLEEP OR SLEEPING TOO MUCH: NEARLY EVERY DAY

## 2024-09-16 ASSESSMENT — ANXIETY QUESTIONNAIRES
5. BEING SO RESTLESS THAT IT IS HARD TO SIT STILL: SEVERAL DAYS
2. NOT BEING ABLE TO STOP OR CONTROL WORRYING: MORE THAN HALF THE DAYS
6. BECOMING EASILY ANNOYED OR IRRITABLE: 1
7. FEELING AFRAID AS IF SOMETHING AWFUL MIGHT HAPPEN: SEVERAL DAYS
4. TROUBLE RELAXING: 3
4. TROUBLE RELAXING: NEARLY EVERY DAY
5. BEING SO RESTLESS THAT IT IS HARD TO SIT STILL: 1
3. WORRYING TOO MUCH ABOUT DIFFERENT THINGS: NEARLY EVERY DAY
3. WORRYING TOO MUCH ABOUT DIFFERENT THINGS: 3
7. FEELING AFRAID AS IF SOMETHING AWFUL MIGHT HAPPEN: 1
2. NOT BEING ABLE TO STOP OR CONTROL WORRYING: 2
1. FEELING NERVOUS, ANXIOUS, OR ON EDGE: 1
GAD7 TOTAL SCORE: 12
IF YOU CHECKED OFF ANY PROBLEMS ON THIS QUESTIONNAIRE, HOW DIFFICULT HAVE THESE PROBLEMS MADE IT FOR YOU TO DO YOUR WORK, TAKE CARE OF THINGS AT HOME, OR GET ALONG WITH OTHER PEOPLE: EXTREMELY DIFFICULT
1. FEELING NERVOUS, ANXIOUS, OR ON EDGE: SEVERAL DAYS
6. BECOMING EASILY ANNOYED OR IRRITABLE: SEVERAL DAYS

## 2024-09-16 ASSESSMENT — PATIENT HEALTH QUESTIONNAIRE - GENERAL
DURING YOUR LIFETIME HAVE EVER YOU FELT DEPRESSED OR SAD MOST DAYS, EVEN IF YOU FELT OKAY SOMETIMES?: NO
DO YOU EVER HAVE TIMES FOR AT LEAST 1 WEEK WHEN MOST OF THE DAY OR NEARLY EVERY DAY YOU ARE EXPERIENCING INCREASED GOAL-DIRECTED ACTIVITY OR ENERGY: NO
DURING YOUR LIFETIME, HAVE YOU EVER HAD ANOTHER TIME OF 2 WEEKS OR MORE WHEN YOU FELT DEPRESSED OR UNINTERESTED IN MOST THINGS, AND HAD MOST OF THE PROBLEMS WE JUST TALKED ABOUT: YES
DURING THE 2 YEAR PERIOD (1 YEAR FOR CHILDREN AND ADOLESCENTS) HAVE THE SYMPTOMS WE JUST DISCUSSED BEEN PRESENT AT LEAST HALF OF THE TIME?: NO
DO YOU EVER HAVE TIMES FOR AT LEAST 4 DAYS IN A ROW BUT LESS THAN 1 WEEK WHEN MOST OF THE DAY OR NEARLY EVERY DAY YOU ARE EXPERIENCING INCREASED GOAL-DIRECTED ACTIVITY OR ENERGY?: NO
DURING THE 2 YEAR PERIOD (1 YEAR FOR CHILDREN AND ADOLESCENTS) HAVE THE SYMPTOMS WE JUST DISCUSSED EVER BEEN ABSENT FOR 2 MONTHS AT A TIME?: NO
WAS THERE A TIME OF AT LEAST 2 MONTHS WHERE YOU FELT OK (WITHOUT DEPRESSION OR LOSS OF INTEREST) BETWEEN NOW AND THE LAST TIME YOU FELT DEPRESSED?: YES
HAVE THE SYMPTOMS OF (NAME DEPRESSION SYMPTOMS THAT WERE ENDORSED) BEEN PRESENT FOR AT LEAST 2 YEARS (AT LEAST 1 YEAR IN CHILDREN AND ADOLESCENTS): NO
DO YOU EVER HAVE TIMES THAT FOR AT LEAST 4 DAYS IN A ROW BUT LESS THAN 1 WEEK YOU HAVE ABNORMALLY AND CONTINUOUSLY HIGH, BIG, OR IRRITABLE MOOD: NO
HAVE THE SYMPTOMS OF (NAME THE HYPOMANIC SYMPTOMS THAT WERE ENDORSED) BEEN PRESENT FOR AT LEAST 2 YEARS (AT LEAST 1 YEAR IN CHILDREN AND ADOLESCENTS): NO
DO YOU EVER HAVE TIMES THAT FOR AT LEAST 1 WEEK YOU HAVE ABNORMALLY AND CONTINUOUSLY HIGH, BIG, OR IRRTABLE MOOD: NO
WITH MOST OF THE PROBLEMS WE JUST TALKED ABOUT, HOW LONG HAVE YOU BEEN FEELING THIS WAY: MONTHS

## 2024-09-17 ENCOUNTER — V-VISIT (OUTPATIENT)
Dept: OTHER | Age: 54
End: 2024-09-17

## 2024-09-17 DIAGNOSIS — Z65.4 VICTIM OF CRIME: Primary | ICD-10-CM

## 2024-09-24 ENCOUNTER — V-VISIT (OUTPATIENT)
Dept: OTHER | Age: 54
End: 2024-09-24

## 2024-09-24 DIAGNOSIS — Z65.4 VICTIM OF CRIME: Primary | ICD-10-CM

## 2024-09-30 ENCOUNTER — APPOINTMENT (OUTPATIENT)
Dept: OTHER | Age: 54
End: 2024-09-30

## 2024-09-30 DIAGNOSIS — Z65.4 VICTIM OF CRIME: Primary | ICD-10-CM

## 2024-09-30 DIAGNOSIS — F43.10 PTSD (POST-TRAUMATIC STRESS DISORDER): ICD-10-CM

## 2024-09-30 PROCEDURE — 90834 PSYTX W PT 45 MINUTES: CPT

## 2024-09-30 ASSESSMENT — SLEEP AND FATIGUE QUESTIONNAIRES
TROUBLE_SLEEPING: 5 - ALWAYS
QUESTION7: 5
SLEEP_QUALITY: 5 - VERY POOR
QUESTION8: 5
TROUBLE_STAYING_ASLEEP: 5 - ALWAYS
QUESTION5: 5
QUESTION1: 5
TRIED_HARD_TO_GET_TO_SLEEP: 5 - VERY MUCH
QUESTION3: 5
QUESTION9: 5
QUESTION6: 5
SLEEP_DISTUBANCE_SCORE: 45
QUESTION2: 5
PROBLEM_WITH_SLEEP: 5 - VERY MUCH
SLEEP_WAS_REFRESHING: 5 - NOT AT ALL
GOT_ENOUGH_SLEEP: 5 - NEVER
SATISFIED_WITH_SLEEP: 5 - NOT AT ALL
DIFFICULTY_FALLING_ASLEEP: 5 - VERY MUCH
QUESTION4: 5

## 2024-09-30 ASSESSMENT — PATIENT HEALTH QUESTIONNAIRE - PHQ9
1. LITTLE INTEREST OR PLEASURE IN DOING THINGS: MORE THAN HALF THE DAYS
2. FEELING DOWN, DEPRESSED OR HOPELESS: SEVERAL DAYS
7. TROUBLE CONCENTRATING ON THINGS, SUCH AS READING THE NEWSPAPER OR WATCHING TELEVISION: SEVERAL DAYS
8. MOVING OR SPEAKING SO SLOWLY THAT OTHER PEOPLE COULD HAVE NOTICED. OR THE OPPOSITE, BEING SO FIGETY OR RESTLESS THAT YOU HAVE BEEN MOVING AROUND A LOT MORE THAN USUAL: SEVERAL DAYS
SUM OF ALL RESPONSES TO PHQ QUESTIONS 1-9: 14
CLINICAL INTERPRETATION OF PHQ2 SCORE: FURTHER SCREENING NEEDED
6. FEELING BAD ABOUT YOURSELF - OR THAT YOU ARE A FAILURE OR HAVE LET YOURSELF OR YOUR FAMILY DOWN: SEVERAL DAYS
3. TROUBLE FALLING OR STAYING ASLEEP OR SLEEPING TOO MUCH: NEARLY EVERY DAY
SUM OF ALL RESPONSES TO PHQ9 QUESTIONS 1 AND 2: 3
9. THOUGHTS THAT YOU WOULD BE BETTER OFF DEAD, OR OF HURTING YOURSELF: SEVERAL DAYS
5. POOR APPETITE OR OVEREATING: NEARLY EVERY DAY
CLINICAL INTERPRETATION OF PHQ9 SCORE: MODERATE DEPRESSION
4. FEELING TIRED OR HAVING LITTLE ENERGY: SEVERAL DAYS
SUM OF ALL RESPONSES TO PHQ9 QUESTIONS 1 AND 2: 3

## 2024-09-30 ASSESSMENT — ANXIETY QUESTIONNAIRES
5. BEING SO RESTLESS THAT IT IS HARD TO SIT STILL: 1
IF YOU CHECKED OFF ANY PROBLEMS ON THIS QUESTIONNAIRE, HOW DIFFICULT HAVE THESE PROBLEMS MADE IT FOR YOU TO DO YOUR WORK, TAKE CARE OF THINGS AT HOME, OR GET ALONG WITH OTHER PEOPLE: SOMEWHAT DIFFICULT
7. FEELING AFRAID AS IF SOMETHING AWFUL MIGHT HAPPEN: 1
3. WORRYING TOO MUCH ABOUT DIFFERENT THINGS: SEVERAL DAYS
4. TROUBLE RELAXING: MORE THAN HALF THE DAYS
2. NOT BEING ABLE TO STOP OR CONTROL WORRYING: 2
GAD7 TOTAL SCORE: 9
1. FEELING NERVOUS, ANXIOUS, OR ON EDGE: SEVERAL DAYS
1. FEELING NERVOUS, ANXIOUS, OR ON EDGE: 1
7. FEELING AFRAID AS IF SOMETHING AWFUL MIGHT HAPPEN: SEVERAL DAYS
5. BEING SO RESTLESS THAT IT IS HARD TO SIT STILL: SEVERAL DAYS
2. NOT BEING ABLE TO STOP OR CONTROL WORRYING: MORE THAN HALF THE DAYS
4. TROUBLE RELAXING: 2
3. WORRYING TOO MUCH ABOUT DIFFERENT THINGS: 1
6. BECOMING EASILY ANNOYED OR IRRITABLE: SEVERAL DAYS
6. BECOMING EASILY ANNOYED OR IRRITABLE: 1

## 2024-09-30 ASSESSMENT — PAIN SCALES - PAIN ENJOYMENT GENERAL ACTIVITY SCALE (PEG)
INTERFERED_ENJOYMENT_LIFE: 3
PEG_TOTALSCORE: 7
AVG_PAIN_PASTWEEK: 8
INTERFERED_GENERAL_ACTIVITY: 10 (COMPLETELY INTERFERES)

## 2024-10-02 ENCOUNTER — V-VISIT (OUTPATIENT)
Dept: OTHER | Age: 54
End: 2024-10-02

## 2024-10-02 DIAGNOSIS — Z65.4 VICTIM OF CRIME: Primary | ICD-10-CM

## 2024-10-07 ENCOUNTER — APPOINTMENT (OUTPATIENT)
Dept: OTHER | Age: 54
End: 2024-10-07

## 2024-10-07 DIAGNOSIS — F43.10 PTSD (POST-TRAUMATIC STRESS DISORDER): ICD-10-CM

## 2024-10-07 DIAGNOSIS — Z65.4 VICTIM OF CRIME: Primary | ICD-10-CM

## 2024-10-08 ENCOUNTER — V-VISIT (OUTPATIENT)
Dept: OTHER | Age: 54
End: 2024-10-08

## 2024-10-08 DIAGNOSIS — Z65.4 VICTIM OF CRIME: Primary | ICD-10-CM

## 2024-10-09 ENCOUNTER — CASE MANAGEMENT (OUTPATIENT)
Dept: OTHER | Age: 54
End: 2024-10-09

## 2024-10-09 DIAGNOSIS — Z65.4 VICTIM OF CRIME: Primary | ICD-10-CM

## 2024-10-15 ENCOUNTER — V-VISIT (OUTPATIENT)
Dept: OTHER | Age: 54
End: 2024-10-15

## 2024-10-15 DIAGNOSIS — Z65.4 VICTIM OF CRIME: Primary | ICD-10-CM

## 2024-10-21 ENCOUNTER — APPOINTMENT (OUTPATIENT)
Dept: OTHER | Age: 54
End: 2024-10-21

## 2024-10-21 DIAGNOSIS — F43.10 PTSD (POST-TRAUMATIC STRESS DISORDER): ICD-10-CM

## 2024-10-21 DIAGNOSIS — Z65.4 VICTIM OF CRIME: Primary | ICD-10-CM

## 2024-10-22 ENCOUNTER — V-VISIT (OUTPATIENT)
Dept: OTHER | Age: 54
End: 2024-10-22

## 2024-10-22 DIAGNOSIS — Z65.4 VICTIM OF CRIME: Primary | ICD-10-CM

## 2024-10-29 ENCOUNTER — V-VISIT (OUTPATIENT)
Dept: OTHER | Age: 54
End: 2024-10-29

## 2024-10-29 DIAGNOSIS — Z65.4 VICTIM OF CRIME: Primary | ICD-10-CM

## 2024-11-04 ENCOUNTER — APPOINTMENT (OUTPATIENT)
Dept: OTHER | Age: 54
End: 2024-11-04

## 2024-11-04 DIAGNOSIS — F43.10 PTSD (POST-TRAUMATIC STRESS DISORDER): Primary | ICD-10-CM

## 2024-11-04 DIAGNOSIS — Z65.4 VICTIM OF CRIME: ICD-10-CM

## 2024-11-05 ENCOUNTER — APPOINTMENT (OUTPATIENT)
Dept: OTHER | Age: 54
End: 2024-11-05

## 2024-11-05 DIAGNOSIS — Z65.4 VICTIM OF CRIME: Primary | ICD-10-CM

## 2024-11-13 ENCOUNTER — APPOINTMENT (OUTPATIENT)
Dept: OTHER | Age: 54
End: 2024-11-13

## 2024-11-13 DIAGNOSIS — Z65.4 VICTIM OF CRIME: Primary | ICD-10-CM

## 2024-11-18 ENCOUNTER — APPOINTMENT (OUTPATIENT)
Dept: OTHER | Age: 54
End: 2024-11-18

## 2024-11-18 DIAGNOSIS — Z65.4 VICTIM OF CRIME: Primary | ICD-10-CM

## 2024-11-18 ASSESSMENT — PATIENT HEALTH QUESTIONNAIRE - PHQ9
SUM OF ALL RESPONSES TO PHQ9 QUESTIONS 1 AND 2: 2
5. POOR APPETITE OR OVEREATING: NEARLY EVERY DAY
7. TROUBLE CONCENTRATING ON THINGS, SUCH AS READING THE NEWSPAPER OR WATCHING TELEVISION: SEVERAL DAYS
10. IF YOU CHECKED OFF ANY PROBLEMS, HOW DIFFICULT HAVE THESE PROBLEMS MADE IT FOR YOU TO DO YOUR WORK, TAKE CARE OF THINGS AT HOME, OR GET ALONG WITH OTHER PEOPLE: YES
9. THOUGHTS THAT YOU WOULD BE BETTER OFF DEAD, OR OF HURTING YOURSELF: NOT AT ALL
1. LITTLE INTEREST OR PLEASURE IN DOING THINGS: SEVERAL DAYS
2. FEELING DOWN, DEPRESSED OR HOPELESS: SEVERAL DAYS
3. TROUBLE FALLING OR STAYING ASLEEP OR SLEEPING TOO MUCH: NEARLY EVERY DAY
SUM OF ALL RESPONSES TO PHQ QUESTIONS 1-9: 13
4. FEELING TIRED OR HAVING LITTLE ENERGY: MORE THAN HALF THE DAYS
8. MOVING OR SPEAKING SO SLOWLY THAT OTHER PEOPLE COULD HAVE NOTICED. OR THE OPPOSITE, BEING SO FIGETY OR RESTLESS THAT YOU HAVE BEEN MOVING AROUND A LOT MORE THAN USUAL: SEVERAL DAYS
CLINICAL INTERPRETATION OF PHQ2 SCORE: NO FURTHER SCREENING NEEDED
6. FEELING BAD ABOUT YOURSELF - OR THAT YOU ARE A FAILURE OR HAVE LET YOURSELF OR YOUR FAMILY DOWN: SEVERAL DAYS
SUM OF ALL RESPONSES TO PHQ9 QUESTIONS 1 AND 2: 2
CLINICAL INTERPRETATION OF PHQ9 SCORE: MODERATE DEPRESSION

## 2024-11-18 ASSESSMENT — ANXIETY QUESTIONNAIRES
7. FEELING AFRAID AS IF SOMETHING AWFUL MIGHT HAPPEN: SEVERAL DAYS
2. NOT BEING ABLE TO STOP OR CONTROL WORRYING: 2
GAD7 TOTAL SCORE: 8
4. TROUBLE RELAXING: SEVERAL DAYS
4. TROUBLE RELAXING: 1
5. BEING SO RESTLESS THAT IT IS HARD TO SIT STILL: SEVERAL DAYS
3. WORRYING TOO MUCH ABOUT DIFFERENT THINGS: 1
3. WORRYING TOO MUCH ABOUT DIFFERENT THINGS: SEVERAL DAYS
7. FEELING AFRAID AS IF SOMETHING AWFUL MIGHT HAPPEN: 1
1. FEELING NERVOUS, ANXIOUS, OR ON EDGE: 1
6. BECOMING EASILY ANNOYED OR IRRITABLE: 1
1. FEELING NERVOUS, ANXIOUS, OR ON EDGE: SEVERAL DAYS
2. NOT BEING ABLE TO STOP OR CONTROL WORRYING: MORE THAN HALF THE DAYS
IF YOU CHECKED OFF ANY PROBLEMS ON THIS QUESTIONNAIRE, HOW DIFFICULT HAVE THESE PROBLEMS MADE IT FOR YOU TO DO YOUR WORK, TAKE CARE OF THINGS AT HOME, OR GET ALONG WITH OTHER PEOPLE: SOMEWHAT DIFFICULT
6. BECOMING EASILY ANNOYED OR IRRITABLE: SEVERAL DAYS
5. BEING SO RESTLESS THAT IT IS HARD TO SIT STILL: 1

## 2024-11-18 ASSESSMENT — SLEEP AND FATIGUE QUESTIONNAIRES
PROBLEM_WITH_SLEEP: 5 - VERY MUCH
QUESTION1: 5
QUESTION7: 5
QUESTION2: 5
TRIED_HARD_TO_GET_TO_SLEEP: 5 - VERY MUCH
TROUBLE_SLEEPING: 5 - ALWAYS
GOT_ENOUGH_SLEEP: 5 - NEVER
SLEEP_DISTUBANCE_SCORE: 44
QUESTION3: 5
QUESTION4: 4
QUESTION9: 5
TROUBLE_STAYING_ASLEEP: 5 - ALWAYS
QUESTION6: 5
SATISFIED_WITH_SLEEP: 5 - NOT AT ALL
QUESTION8: 5
QUESTION5: 5
SLEEP_QUALITY: 5 - VERY POOR
SLEEP_WAS_REFRESHING: 5 - NOT AT ALL

## 2024-11-18 ASSESSMENT — PATIENT HEALTH QUESTIONNAIRE - GENERAL
WITH MOST OF THE PROBLEMS WE JUST TALKED ABOUT, HOW LONG HAVE YOU BEEN FEELING THIS WAY: COUPLE OF WEEKS
DURING YOUR LIFETIME, HAVE YOU EVER HAD ANOTHER TIME OF 2 WEEKS OR MORE WHEN YOU FELT DEPRESSED OR UNINTERESTED IN MOST THINGS, AND HAD MOST OF THE PROBLEMS WE JUST TALKED ABOUT: YES
DO YOU EVER HAVE TIMES FOR AT LEAST 4 DAYS IN A ROW BUT LESS THAN 1 WEEK WHEN MOST OF THE DAY OR NEARLY EVERY DAY YOU ARE EXPERIENCING INCREASED GOAL-DIRECTED ACTIVITY OR ENERGY?: NO
HAVE THE SYMPTOMS OF (NAME DEPRESSION SYMPTOMS THAT WERE ENDORSED) BEEN PRESENT FOR AT LEAST 2 YEARS (AT LEAST 1 YEAR IN CHILDREN AND ADOLESCENTS): NO
HAVE THE SYMPTOMS OF (NAME THE HYPOMANIC SYMPTOMS THAT WERE ENDORSED) BEEN PRESENT FOR AT LEAST 2 YEARS (AT LEAST 1 YEAR IN CHILDREN AND ADOLESCENTS): NO
DO YOU EVER HAVE TIMES FOR AT LEAST 1 WEEK WHEN MOST OF THE DAY OR NEARLY EVERY DAY YOU ARE EXPERIENCING INCREASED GOAL-DIRECTED ACTIVITY OR ENERGY: NO
DURING THE 2 YEAR PERIOD (1 YEAR FOR CHILDREN AND ADOLESCENTS) HAVE THE SYMPTOMS WE JUST DISCUSSED BEEN PRESENT AT LEAST HALF OF THE TIME?: NO
DURING YOUR LIFETIME HAVE EVER YOU FELT DEPRESSED OR SAD MOST DAYS, EVEN IF YOU FELT OKAY SOMETIMES?: NO
DURING THE 2 YEAR PERIOD (1 YEAR FOR CHILDREN AND ADOLESCENTS) HAVE THE SYMPTOMS WE JUST DISCUSSED EVER BEEN ABSENT FOR 2 MONTHS AT A TIME?: NO
WAS THERE A TIME OF AT LEAST 2 MONTHS WHERE YOU FELT OK (WITHOUT DEPRESSION OR LOSS OF INTEREST) BETWEEN NOW AND THE LAST TIME YOU FELT DEPRESSED?: YES
DO YOU EVER HAVE TIMES THAT FOR AT LEAST 4 DAYS IN A ROW BUT LESS THAN 1 WEEK YOU HAVE ABNORMALLY AND CONTINUOUSLY HIGH, BIG, OR IRRITABLE MOOD: NO
DO YOU EVER HAVE TIMES THAT FOR AT LEAST 1 WEEK YOU HAVE ABNORMALLY AND CONTINUOUSLY HIGH, BIG, OR IRRTABLE MOOD: NO

## 2024-11-18 ASSESSMENT — PAIN SCALES - PAIN ENJOYMENT GENERAL ACTIVITY SCALE (PEG)
PEG_TOTALSCORE: 10
AVG_PAIN_PASTWEEK: 10 (PAIN AS BAS AS YOU CAN IMAGINE)
INTERFERED_GENERAL_ACTIVITY: 10 (COMPLETELY INTERFERES)
INTERFERED_ENJOYMENT_LIFE: 10 (COMPLETELY INTERFERES)

## 2024-11-20 ENCOUNTER — APPOINTMENT (OUTPATIENT)
Dept: OTHER | Age: 54
End: 2024-11-20

## 2024-11-20 DIAGNOSIS — Z65.4 VICTIM OF CRIME: Primary | ICD-10-CM

## 2024-12-02 ENCOUNTER — APPOINTMENT (OUTPATIENT)
Dept: OTHER | Age: 54
End: 2024-12-02

## 2024-12-02 DIAGNOSIS — F43.10 PTSD (POST-TRAUMATIC STRESS DISORDER): Primary | ICD-10-CM

## 2024-12-02 DIAGNOSIS — Z65.4 VICTIM OF CRIME: ICD-10-CM

## 2024-12-03 ENCOUNTER — APPOINTMENT (OUTPATIENT)
Dept: OTHER | Age: 54
End: 2024-12-03

## 2024-12-05 ENCOUNTER — V-VISIT (OUTPATIENT)
Dept: OTHER | Age: 54
End: 2024-12-05

## 2024-12-05 DIAGNOSIS — Z65.4 VICTIM OF CRIME: Primary | ICD-10-CM

## 2024-12-10 ENCOUNTER — CLINICAL DOCUMENTATION (OUTPATIENT)
Dept: OTHER | Age: 54
End: 2024-12-10

## 2025-02-28 ENCOUNTER — TELEPHONE (OUTPATIENT)
Dept: OTHER | Age: 55
End: 2025-02-28

## 2025-07-28 ENCOUNTER — E-ADVICE (OUTPATIENT)
Dept: OTHER | Age: 55
End: 2025-07-28

## (undated) DEVICE — VIOLET BRAIDED (POLYGLACTIN 910), SYNTHETIC ABSORBABLE SUTURE: Brand: COATED VICRYL

## (undated) DEVICE — PEN: MARKING STD PT 100/CS: Brand: MEDICAL ACTION INDUSTRIES

## (undated) DEVICE — 3.0MM PRECISION NEURO (MATCH HEAD)

## (undated) DEVICE — LAMINECTOMY: Brand: MEDLINE INDUSTRIES, INC.

## (undated) DEVICE — UNDYED BRAIDED (POLYGLACTIN 910), SYNTHETIC ABSORBABLE SUTURE: Brand: COATED VICRYL

## (undated) DEVICE — CHLORAPREP 26ML APPLICATOR

## (undated) DEVICE — FLOSEAL HEMOSTATIC MATRIX, 5ML: Brand: FLOSEAL HEMOSTATIC MATRIX

## (undated) DEVICE — 3M™ STERI-DRAPE™ U-DRAPE 1015: Brand: STERI-DRAPE™

## (undated) DEVICE — NON-ADHERENT DRESSING: Brand: TELFA

## (undated) DEVICE — DERMABOND LIQUID ADHESIVE

## (undated) DEVICE — WRAP COOLING BACK W/NO PILLOW

## (undated) DEVICE — GAUZE SPONGES,12 PLY: Brand: CURITY

## (undated) DEVICE — 3M™ TEGADERM™ TRANSPARENT FILM DRESSING, 1626W, 4 IN X 4-3/4 IN (10 CM X 12 CM), 50 EACH/CARTON, 4 CARTON/CASE: Brand: 3M™ TEGADERM™

## (undated) DEVICE — DRAPE SHEET LG

## (undated) DEVICE — KIT PATIENT CARE SPINAL TABLE

## (undated) DEVICE — GAMMEX® PI HYBRID SIZE 6.5, STERILE POWDER-FREE SURGICAL GLOVE, POLYISOPRENE AND NEOPRENE BLEND: Brand: GAMMEX

## (undated) DEVICE — SUTURE VICRYL 2-0 CT-2

## (undated) DEVICE — C-ARMOR C-ARM EQUIPMENT COVERS CLEAR STERILE UNIVERSAL FIT 12 PER CASE: Brand: C-ARMOR

## (undated) DEVICE — 3M™ IOBAN™ 2 ANTIMICROBIAL INCISE DRAPE 6640EZ: Brand: IOBAN™ 2

## (undated) DEVICE — GAMMEX® PI HYBRID SIZE 8.5, STERILE POWDER-FREE SURGICAL GLOVE, POLYISOPRENE AND NEOPRENE BLEND: Brand: GAMMEX